# Patient Record
Sex: FEMALE
[De-identification: names, ages, dates, MRNs, and addresses within clinical notes are randomized per-mention and may not be internally consistent; named-entity substitution may affect disease eponyms.]

---

## 2022-11-06 ENCOUNTER — NURSE TRIAGE (OUTPATIENT)
Dept: OTHER | Facility: CLINIC | Age: 82
End: 2022-11-06

## 2022-11-07 NOTE — TELEPHONE ENCOUNTER
Location of patient: [place]    Subjective: Caller states \"Fell backward in her yard and hit the back of her head, no LOC, dizziness, no nausea, no vomiting\"     Current Symptoms: see above     Associated Symptoms: NA    Pain Severity: 1/10; aching; constant    Temperature: N/a   n/a     What has been tried: Nothing     Recommended disposition: See HCP within [address] advice provided, patient verbalizes understanding; denies any other questions or concerns.     Outcome: Patient/caller agrees to proceed to nearest [facility]